# Patient Record
Sex: FEMALE | Race: WHITE | URBAN - METROPOLITAN AREA
[De-identification: names, ages, dates, MRNs, and addresses within clinical notes are randomized per-mention and may not be internally consistent; named-entity substitution may affect disease eponyms.]

---

## 2020-11-11 ENCOUNTER — OFFICE VISIT (OUTPATIENT)
Dept: URBAN - METROPOLITAN AREA CLINIC 13 | Facility: CLINIC | Age: 80
End: 2020-11-11
Payer: MEDICARE

## 2020-11-11 DIAGNOSIS — H04.123 DRY EYE SYNDROME OF BILATERAL LACRIMAL GLANDS: ICD-10-CM

## 2020-11-11 PROCEDURE — 92134 CPTRZ OPH DX IMG PST SGM RTA: CPT | Performed by: OPHTHALMOLOGY

## 2020-11-11 PROCEDURE — 92014 COMPRE OPH EXAM EST PT 1/>: CPT | Performed by: OPHTHALMOLOGY

## 2020-11-11 PROCEDURE — 67028 INJECTION EYE DRUG: CPT | Performed by: OPHTHALMOLOGY

## 2020-11-11 ASSESSMENT — INTRAOCULAR PRESSURE
OD: 16
OS: 17

## 2020-11-11 NOTE — IMPRESSION/PLAN
Impression: Macular Degeneration, dry with PED OD. Status: Symptomatic. Plan: Exam and OCT reveal a PED and atrophy OD. Exam and OCT reveal no IRF. An IVFA from 05/19/2020 demonstrated no leakage. Fundus photos from 05/19/2020 demonstrated drusen. Observe. Recommend AREANDREI and Jake.  Thanks, Germaine Cornell

## 2020-11-11 NOTE — IMPRESSION/PLAN
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 Status: Symptomatic. Plan: Patient presents complaining of loss of vision OS. Exam and OCT reveal a PED OS with possible IRF and exudate. Loli Ziegler An IVFA from 05/19/2020 demonstrated no leakage. Recommend trial of Avastin OS today; discussed that if vision does not improve, then she can be observed off treatment. R/B/A discussed, patient elects to proceed. Intravitreal Avastin injected OS today without complication. 

RTC 4 wks with OCT OU, poss Avastin OS

## 2020-12-15 ENCOUNTER — OFFICE VISIT (OUTPATIENT)
Dept: URBAN - METROPOLITAN AREA CLINIC 68 | Facility: CLINIC | Age: 80
End: 2020-12-15
Payer: MEDICARE

## 2020-12-15 PROCEDURE — 92134 CPTRZ OPH DX IMG PST SGM RTA: CPT | Performed by: OPHTHALMOLOGY

## 2020-12-15 PROCEDURE — 67028 INJECTION EYE DRUG: CPT | Performed by: OPHTHALMOLOGY

## 2020-12-15 PROCEDURE — 92014 COMPRE OPH EXAM EST PT 1/>: CPT | Performed by: OPHTHALMOLOGY

## 2020-12-15 ASSESSMENT — INTRAOCULAR PRESSURE
OS: 11
OD: 12

## 2020-12-15 NOTE — IMPRESSION/PLAN
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 Status: Symptomatic.
  s/p Avastin OS x 1, last 11/11/2020 Plan: The patient notes distortion. Exam and OCT reveal a PED OS. An IVFA from 05/19/2020 demonstrated no leakage. Recommend Avastin. R/B/A discussed with patient. The risks of Avastin were discussed, including off-label use and compounding pharmacy risks, and the patient elects to proceed with Avastin. After 2% Subconjunctival anesthesia & betadine prep, 1.25mg of Avastin was injected in the eye. Cold compress and Tylenol suggested for pain if needed. 

Return in 4-6 wks with OCT OU, possible Avastin OS, IVFA OS 1st

## 2020-12-15 NOTE — IMPRESSION/PLAN
Impression: Macular Degeneration, dry with PED OD. Status: Symptomatic. Plan: Exam and OCT reveal a PED and atrophy OD. Exam and OCT reveal no IRF. An IVFA from 05/19/2020 demonstrated no leakage. Fundus photos from 05/19/2020 demonstrated drusen. Observe. Recommend AREANDREI and Jake.  Thanks, Micah Rea

## 2021-02-02 ENCOUNTER — OFFICE VISIT (OUTPATIENT)
Dept: URBAN - METROPOLITAN AREA CLINIC 73 | Facility: CLINIC | Age: 81
End: 2021-02-02
Payer: MEDICARE

## 2021-02-02 PROCEDURE — 67028 INJECTION EYE DRUG: CPT | Performed by: OPHTHALMOLOGY

## 2021-02-02 PROCEDURE — 92134 CPTRZ OPH DX IMG PST SGM RTA: CPT | Performed by: OPHTHALMOLOGY

## 2021-02-02 PROCEDURE — 92235 FLUORESCEIN ANGRPH MLTIFRAME: CPT | Performed by: OPHTHALMOLOGY

## 2021-02-02 PROCEDURE — 92014 COMPRE OPH EXAM EST PT 1/>: CPT | Performed by: OPHTHALMOLOGY

## 2021-02-02 ASSESSMENT — INTRAOCULAR PRESSURE
OS: 13
OD: 12

## 2021-02-02 NOTE — IMPRESSION/PLAN
Impression: Macular Degeneration, dry with PED OD. Status: Symptomatic. Plan: Exam and OCT reveal a PED and atrophy OD. Exam and OCT reveal no IRF. An IVFA from 02/02/2021 demonstrated no leakage. Fundus photos from 02/02/2021 demonstrated drusen. Observe. Recommend ARENADREI and Jake.  Thanks, Taylor Neely

## 2021-02-02 NOTE — IMPRESSION/PLAN
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 Status: Symptomatic.
  s/p Avastin OS x 1, last 11/11/2020 Plan: The patient notes distortion. Exam and OCT reveal a PED OS. An IVFA from 02/02/2021 demonstrated staining. Recommend Avastin. R/B/A discussed with patient. The risks of Avastin were discussed, including off-label use and compounding pharmacy risks, and the patient elects to proceed with Avastin. After 2% Subconjunctival anesthesia & betadine prep, 1.25mg of Avastin was injected in the eye. Cold compress and Tylenol suggested for pain if needed. Return in 4-6 wks with OCT OU, possible Lucentis OS.

## 2021-03-02 ENCOUNTER — OFFICE VISIT (OUTPATIENT)
Dept: URBAN - METROPOLITAN AREA CLINIC 73 | Facility: CLINIC | Age: 81
End: 2021-03-02
Payer: MEDICARE

## 2021-03-02 PROCEDURE — 67028 INJECTION EYE DRUG: CPT | Performed by: OPHTHALMOLOGY

## 2021-03-02 PROCEDURE — 92134 CPTRZ OPH DX IMG PST SGM RTA: CPT | Performed by: OPHTHALMOLOGY

## 2021-03-02 PROCEDURE — 92014 COMPRE OPH EXAM EST PT 1/>: CPT | Performed by: OPHTHALMOLOGY

## 2021-03-02 ASSESSMENT — INTRAOCULAR PRESSURE
OS: 15
OD: 17

## 2021-03-02 NOTE — IMPRESSION/PLAN
Impression: Macular Degeneration, Wet with PED OS. Status: Symptomatic.
  s/p Avastin OS, last 02/02/2021 Plan: The patient notes worsening distortion. Exam and OCT reveal a PED OS. An IVFA from 02/02/2021 demonstrated staining. Recommend Lucentis. R/B/A discussed with patient, and the patient elects to proceed with Lucentis. Return in 4-6 weeks with OCT OU, possible Lucentis OS.

## 2021-03-02 NOTE — IMPRESSION/PLAN
Impression: Macular Degeneration, dry with PED OD. Status: Symptomatic. Plan: Exam and OCT reveal a PED and atrophy OD. Exam and OCT reveal no IRF. An IVFA from 02/02/2021 demonstrated no leakage. Fundus photos from 02/02/2021 demonstrated drusen. Observe. Recommend AREANDREI and Jake.  Thanks, Cecil Lu

## 2021-03-30 ENCOUNTER — OFFICE VISIT (OUTPATIENT)
Dept: URBAN - METROPOLITAN AREA CLINIC 73 | Facility: CLINIC | Age: 81
End: 2021-03-30
Payer: MEDICARE

## 2021-03-30 DIAGNOSIS — E11.3293 TYPE 2 DIAB WITH MILD NONP RTNOP WITHOUT MACULAR EDEMA, BI: ICD-10-CM

## 2021-03-30 PROCEDURE — 92014 COMPRE OPH EXAM EST PT 1/>: CPT | Performed by: OPHTHALMOLOGY

## 2021-03-30 PROCEDURE — 67028 INJECTION EYE DRUG: CPT | Performed by: OPHTHALMOLOGY

## 2021-03-30 ASSESSMENT — INTRAOCULAR PRESSURE
OD: 13
OS: 13

## 2021-03-30 NOTE — IMPRESSION/PLAN
Impression: Macular Degeneration, Wet with PED OS. Status: Symptomatic.
  s/p Avastin OS, last 02/02/2021
s/p Lucentis x1  03/02/2021  Plan: Exam and OCT reveal a PED OS. An IVFA from 02/02/2021 demonstrated staining. Recommend Lucentis. R/B/A discussed with patient, and the patient elects to proceed with Lucentis. Return in 4-6 weeks with OCT OU, possible Lucentis OS.

## 2021-04-27 ENCOUNTER — OFFICE VISIT (OUTPATIENT)
Dept: URBAN - METROPOLITAN AREA CLINIC 73 | Facility: CLINIC | Age: 81
End: 2021-04-27
Payer: MEDICARE

## 2021-04-27 DIAGNOSIS — Z96.1 PRESENCE OF INTRAOCULAR LENS: ICD-10-CM

## 2021-04-27 DIAGNOSIS — H43.813 VITREOUS DEGENERATION, BILATERAL: ICD-10-CM

## 2021-04-27 PROCEDURE — 92014 COMPRE OPH EXAM EST PT 1/>: CPT | Performed by: OPHTHALMOLOGY

## 2021-04-27 PROCEDURE — 92134 CPTRZ OPH DX IMG PST SGM RTA: CPT | Performed by: OPHTHALMOLOGY

## 2021-04-27 PROCEDURE — 67028 INJECTION EYE DRUG: CPT | Performed by: OPHTHALMOLOGY

## 2021-04-27 ASSESSMENT — INTRAOCULAR PRESSURE
OS: 12
OD: 11

## 2021-05-25 ENCOUNTER — OFFICE VISIT (OUTPATIENT)
Dept: URBAN - METROPOLITAN AREA CLINIC 73 | Facility: CLINIC | Age: 81
End: 2021-05-25
Payer: MEDICARE

## 2021-05-25 PROCEDURE — 67028 INJECTION EYE DRUG: CPT | Performed by: OPHTHALMOLOGY

## 2021-05-25 PROCEDURE — 92014 COMPRE OPH EXAM EST PT 1/>: CPT | Performed by: OPHTHALMOLOGY

## 2021-05-25 ASSESSMENT — INTRAOCULAR PRESSURE
OD: 16
OS: 17

## 2021-05-25 NOTE — IMPRESSION/PLAN
Impression: Macular Degeneration, Wet with PED OS. Status: Symptomatic.
  s/p Avastin OS, last 02/02/2021
  s/p Lucentis x 3  04/27/2021 Plan: The patient notes distortion. Exam and OCT reveal a PED OS. An IVFA from 02/02/2021 demonstrated staining. Recommend Lucentis. R/B/A discussed with patient, and the patient elects to proceed with Lucentis. Return in 4-6 weeks with OCT OU, possible Lucentis OS.

## 2021-07-06 ENCOUNTER — OFFICE VISIT (OUTPATIENT)
Dept: URBAN - METROPOLITAN AREA CLINIC 73 | Facility: CLINIC | Age: 81
End: 2021-07-06
Payer: MEDICARE

## 2021-07-06 PROCEDURE — 67028 INJECTION EYE DRUG: CPT | Performed by: OPHTHALMOLOGY

## 2021-07-06 PROCEDURE — 92014 COMPRE OPH EXAM EST PT 1/>: CPT | Performed by: OPHTHALMOLOGY

## 2021-07-06 PROCEDURE — 92134 CPTRZ OPH DX IMG PST SGM RTA: CPT | Performed by: OPHTHALMOLOGY

## 2021-07-06 ASSESSMENT — INTRAOCULAR PRESSURE
OS: 14
OD: 13

## 2021-07-06 NOTE — IMPRESSION/PLAN
Impression: Macular Degeneration, Wet with PED OS. Status: Symptomatic.
  s/p Avastin OS, last 02/02/2021
  s/p Lucentis x 4  05/25/2021 Plan: The patient notes continued distortion. Exam and OCT reveal a PED OS. An IVFA from 02/02/2021 demonstrated staining. Recommend Lucentis. R/B/A discussed with patient, and the patient elects to proceed with Lucentis. Return in 4-6 weeks with OCT OU, possible Lucentis OS.

## 2021-08-31 ENCOUNTER — OFFICE VISIT (OUTPATIENT)
Dept: URBAN - METROPOLITAN AREA CLINIC 73 | Facility: CLINIC | Age: 81
End: 2021-08-31
Payer: MEDICARE

## 2021-08-31 PROCEDURE — 67028 INJECTION EYE DRUG: CPT | Performed by: OPHTHALMOLOGY

## 2021-08-31 PROCEDURE — 92014 COMPRE OPH EXAM EST PT 1/>: CPT | Performed by: OPHTHALMOLOGY

## 2021-08-31 ASSESSMENT — INTRAOCULAR PRESSURE
OS: 13
OD: 12

## 2021-08-31 NOTE — IMPRESSION/PLAN
Impression: Macular Degeneration, Wet with PED OS. Status: Symptomatic.
  s/p Avastin OS, last 02/02/2021
  s/p Lucentis x 5  07/06/2021 Plan: Exam and OCT reveal a PED OS. An IVFA from 02/02/2021 demonstrated staining. Recommend Lucentis. R/B/A discussed with patient, and the patient elects to proceed with Lucentis. Will consider Avastin Return in 4-6 weeks with OCT OU, possible Avastin OS Ezequiel Zaldivar

## 2021-09-28 ENCOUNTER — OFFICE VISIT (OUTPATIENT)
Dept: URBAN - METROPOLITAN AREA CLINIC 73 | Facility: CLINIC | Age: 81
End: 2021-09-28
Payer: MEDICARE

## 2021-09-28 DIAGNOSIS — H35.3231 EXUDATIVE AGE-REL MCLR DEGN, BI, WITH ACTV CHRDL NEOVAS: Primary | ICD-10-CM

## 2021-09-28 PROCEDURE — 92014 COMPRE OPH EXAM EST PT 1/>: CPT | Performed by: OPHTHALMOLOGY

## 2021-09-28 PROCEDURE — 92134 CPTRZ OPH DX IMG PST SGM RTA: CPT | Performed by: OPHTHALMOLOGY

## 2021-09-28 PROCEDURE — 67028 INJECTION EYE DRUG: CPT | Performed by: OPHTHALMOLOGY

## 2021-09-28 ASSESSMENT — INTRAOCULAR PRESSURE
OS: 13
OD: 15

## 2021-09-28 NOTE — IMPRESSION/PLAN
Impression: Macular Degeneration, Wet with PED OS. Status: Symptomatic.
  s/p Avastin OS, last 02/02/2021
  s/p Lucentis x 6  08/31/2021  Plan: Exam and OCT reveal a PED OS. An IVFA from 02/02/2021 demonstrated staining. Fundus photos from 2/2/2021 demonstrated drusen. Recommend Avastin. R/B/A discussed with patient. The risks of Avastin were discussed, including off-label use and compounding pharmacy risks, and the patient elects to proceed with Avastin. After 2% Subconjunctival anesthesia & betadine prep, 1.25mg of Avastin was injected in the eye. Cold compress and Tylenol suggested for pain if needed. 

Return in 4-6 weeks with OCT OU, possible Avastin OS, IVFA OS 1st

## 2021-11-09 ENCOUNTER — OFFICE VISIT (OUTPATIENT)
Dept: URBAN - METROPOLITAN AREA CLINIC 73 | Facility: CLINIC | Age: 81
End: 2021-11-09
Payer: MEDICARE

## 2021-11-09 PROCEDURE — 92014 COMPRE OPH EXAM EST PT 1/>: CPT | Performed by: OPHTHALMOLOGY

## 2021-11-09 PROCEDURE — 92134 CPTRZ OPH DX IMG PST SGM RTA: CPT | Performed by: OPHTHALMOLOGY

## 2021-11-09 PROCEDURE — 67028 INJECTION EYE DRUG: CPT | Performed by: OPHTHALMOLOGY

## 2021-11-09 ASSESSMENT — INTRAOCULAR PRESSURE
OS: 14
OD: 15

## 2021-11-09 NOTE — IMPRESSION/PLAN
Impression: Macular Degeneration, Wet with PED OS. Status: Symptomatic.
  s/p Avastin OS, last 09/28/2021
  s/p Lucentis x 6  08/31/2021 Plan: The patient notes blurring. Exam and OCT reveal a PED OS. An IVFA from 02/02/2021 demonstrated staining. Fundus photos from 2/2/2021 demonstrated drusen. Recommend Avastin. R/B/A discussed with patient. The risks of Avastin were discussed, including off-label use and compounding pharmacy risks, and the patient elects to proceed with Avastin. After 2% Subconjunctival anesthesia & betadine prep, 1.25mg of Avastin was injected in the eye. Cold compress and Tylenol suggested for pain if needed. 

Return in 4-6 weeks with OCT OU, possible Avastin OS, IVFA OS 1st

## 2021-12-07 ENCOUNTER — OFFICE VISIT (OUTPATIENT)
Dept: URBAN - METROPOLITAN AREA CLINIC 73 | Facility: CLINIC | Age: 81
End: 2021-12-07
Payer: MEDICARE

## 2021-12-07 PROCEDURE — 92134 CPTRZ OPH DX IMG PST SGM RTA: CPT | Performed by: OPHTHALMOLOGY

## 2021-12-07 PROCEDURE — 92235 FLUORESCEIN ANGRPH MLTIFRAME: CPT | Performed by: OPHTHALMOLOGY

## 2021-12-07 PROCEDURE — 67028 INJECTION EYE DRUG: CPT | Performed by: OPHTHALMOLOGY

## 2021-12-07 ASSESSMENT — INTRAOCULAR PRESSURE
OD: 13
OS: 14

## 2021-12-07 NOTE — IMPRESSION/PLAN
Impression: Macular Degeneration, Wet with PED OS. Status: Symptomatic.
  s/p Avastin OS, last 11/09/2021 
  s/p Lucentis x 6  08/31/2021 Plan: The patient notes continued blurring. Exam and OCT reveal a PED OS. An IVFA from 12/07/2021 demonstrated staining. Fundus photos from 12/07/2021 demonstrated drusen. Recommend Avastin. R/B/A discussed with patient. The risks of Avastin were discussed, including off-label use and compounding pharmacy risks, and the patient elects to proceed with Avastin. After 2% Subconjunctival anesthesia & betadine prep, 1.25mg of Avastin was injected in the eye. Cold compress and Tylenol suggested for pain if needed. 

Return in 4-6 weeks with OCT OU, possible Avastin OS

## 2021-12-07 NOTE — IMPRESSION/PLAN
Impression: Macular Degeneration, dry with PED OD. Status: Symptomatic. Plan: Exam and OCT reveal a PED and atrophy OD. Exam and OCT reveal no IRF. An IVFA from 02/02/2021 demonstrated no leakage. Fundus photos from 02/02/2021 demonstrated drusen. Observe. Recommend PATRICK and Jake.  Thanks, Douglas Bearden

## 2022-01-04 ENCOUNTER — OFFICE VISIT (OUTPATIENT)
Dept: URBAN - METROPOLITAN AREA CLINIC 73 | Facility: CLINIC | Age: 82
End: 2022-01-04
Payer: MEDICARE

## 2022-01-04 PROCEDURE — 67028 INJECTION EYE DRUG: CPT | Performed by: OPHTHALMOLOGY

## 2022-01-04 PROCEDURE — 92014 COMPRE OPH EXAM EST PT 1/>: CPT | Performed by: OPHTHALMOLOGY

## 2022-01-04 PROCEDURE — 92134 CPTRZ OPH DX IMG PST SGM RTA: CPT | Performed by: OPHTHALMOLOGY

## 2022-01-04 NOTE — IMPRESSION/PLAN
Impression: Macular Degeneration, dry with PED OD. Status: Symptomatic. Plan: Exam and OCT reveal a PED and atrophy OD. Exam and OCT reveal no IRF. An IVFA from 02/02/2021 demonstrated no leakage. Fundus photos from 02/02/2021 demonstrated drusen. Observe. Recommend PATRICK and Jake.  Thanks, Cassandra Ahumada

## 2022-01-04 NOTE — IMPRESSION/PLAN
Impression: Macular Degeneration, Wet with PED OS. Status: Symptomatic.
  s/p Avastin OS, last 12/07/2021 
  s/p Lucentis x 6  08/31/2021 Plan: Exam and OCT reveal a PED OS. An IVFA from 12/07/2021 demonstrated staining. Fundus photos from 12/07/2021 demonstrated drusen. Recommend Avastin. R/B/A discussed with patient. The risks of Avastin were discussed, including off-label use and compounding pharmacy risks, and the patient elects to proceed with Avastin. After 2% Subconjunctival anesthesia & betadine prep, 1.25mg of Avastin was injected in the eye. Cold compress and Tylenol suggested for pain if needed. 

Return in 4-6 weeks with OCT OU, possible Avastin OS

## 2022-02-01 ENCOUNTER — OFFICE VISIT (OUTPATIENT)
Dept: URBAN - METROPOLITAN AREA CLINIC 73 | Facility: CLINIC | Age: 82
End: 2022-02-01
Payer: MEDICARE

## 2022-02-01 PROCEDURE — 92014 COMPRE OPH EXAM EST PT 1/>: CPT | Performed by: OPHTHALMOLOGY

## 2022-02-01 PROCEDURE — 67028 INJECTION EYE DRUG: CPT | Performed by: OPHTHALMOLOGY

## 2022-02-01 PROCEDURE — 92134 CPTRZ OPH DX IMG PST SGM RTA: CPT | Performed by: OPHTHALMOLOGY

## 2022-02-01 ASSESSMENT — INTRAOCULAR PRESSURE
OS: 13
OD: 15

## 2022-02-01 NOTE — IMPRESSION/PLAN
Impression: Macular Degeneration, dry with PED OD. Status: Symptomatic. Plan: Exam and OCT reveal a PED and atrophy OD. Exam and OCT reveal no IRF. An IVFA from 02/02/2021 demonstrated no leakage. Fundus photos from 02/02/2021 demonstrated drusen. Observe. Recommend AREANDREI and Jake.  Thanks, Taylor Neely

## 2022-02-01 NOTE — IMPRESSION/PLAN
Impression: Macular Degeneration, Wet with PED OS. Status: Symptomatic.
  s/p Avastin OS, last 01/04/2022 
  s/p Lucentis x 6  08/31/2021 Plan: The patient notes blurring. Exam and OCT reveal a PED OS. An IVFA from 12/07/2021 demonstrated staining. Fundus photos from 12/07/2021 demonstrated drusen. Recommend Avastin. R/B/A discussed with patient. The risks of Avastin were discussed, including off-label use and compounding pharmacy risks, and the patient elects to proceed with Avastin. After 2% Subconjunctival anesthesia & betadine prep, 1.25mg of Avastin was injected in the eye. Cold compress and Tylenol suggested for pain if needed. 

Return in 4-6 weeks with OCT OU, possible Avastin OS

## 2022-03-01 ENCOUNTER — OFFICE VISIT (OUTPATIENT)
Dept: URBAN - METROPOLITAN AREA CLINIC 73 | Facility: CLINIC | Age: 82
End: 2022-03-01
Payer: MEDICARE

## 2022-03-01 PROCEDURE — 92134 CPTRZ OPH DX IMG PST SGM RTA: CPT | Performed by: OPHTHALMOLOGY

## 2022-03-01 PROCEDURE — 67028 INJECTION EYE DRUG: CPT | Performed by: OPHTHALMOLOGY

## 2022-03-01 PROCEDURE — 92014 COMPRE OPH EXAM EST PT 1/>: CPT | Performed by: OPHTHALMOLOGY

## 2022-03-01 ASSESSMENT — INTRAOCULAR PRESSURE
OD: 16
OS: 15

## 2022-03-01 NOTE — IMPRESSION/PLAN
Impression: Macular Degeneration, Wet with PED OS. Status: Symptomatic.
  s/p Avastin OS, last 02/01/2022
  s/p Lucentis x 6  08/31/2021 Plan: The patient notes continued blurring. Exam and OCT reveal a PED OS. An IVFA from 12/07/2021 demonstrated staining. Fundus photos from 12/07/2021 demonstrated drusen. Recommend Avastin. R/B/A discussed with patient. The risks of Avastin were discussed, including off-label use and compounding pharmacy risks, and the patient elects to proceed with Avastin. After 2% Subconjunctival anesthesia & betadine prep, 1.25mg of Avastin was injected in the eye. Cold compress and Tylenol suggested for pain if needed. 

Return in 4-6 weeks with OCT OU, possible Avastin OS

## 2022-03-01 NOTE — IMPRESSION/PLAN
Impression: Macular Degeneration, dry with PED OD. Status: Symptomatic. Plan: Exam and OCT reveal a PED and atrophy OD. Exam and OCT reveal no IRF. An IVFA from 02/02/2021 demonstrated no leakage. Fundus photos from 02/02/2021 demonstrated drusen. Observe. Recommend AREANDREI and Jake.  Thanks, Tari Butler

## 2022-04-12 ENCOUNTER — OFFICE VISIT (OUTPATIENT)
Dept: URBAN - METROPOLITAN AREA CLINIC 73 | Facility: CLINIC | Age: 82
End: 2022-04-12
Payer: MEDICARE

## 2022-04-12 DIAGNOSIS — H04.123 DRY EYE SYNDROME OF BILATERAL LACRIMAL GLANDS: ICD-10-CM

## 2022-04-12 DIAGNOSIS — H43.813 VITREOUS DEGENERATION, BILATERAL: ICD-10-CM

## 2022-04-12 DIAGNOSIS — E11.3293 TYPE 2 DIAB WITH MILD NONP RTNOP WITHOUT MACULAR EDEMA, BI: ICD-10-CM

## 2022-04-12 DIAGNOSIS — Z96.1 PRESENCE OF INTRAOCULAR LENS: ICD-10-CM

## 2022-04-12 DIAGNOSIS — H35.3231 EXUDATIVE AGE-REL MCLR DEGN, BI, WITH ACTV CHRDL NEOVAS: Primary | ICD-10-CM

## 2022-04-12 PROCEDURE — 92134 CPTRZ OPH DX IMG PST SGM RTA: CPT | Performed by: OPHTHALMOLOGY

## 2022-04-12 PROCEDURE — 92014 COMPRE OPH EXAM EST PT 1/>: CPT | Performed by: OPHTHALMOLOGY

## 2022-04-12 PROCEDURE — 67028 INJECTION EYE DRUG: CPT | Performed by: OPHTHALMOLOGY

## 2022-04-12 NOTE — IMPRESSION/PLAN
Impression: Macular Degeneration, Wet with PED OS. Status: Symptomatic.
  s/p Avastin OS, last 03/01/2022
  s/p Lucentis x 6  08/31/2021 Plan: Exam and OCT reveal a PED OS. An IVFA from 12/07/2021 demonstrated staining. Fundus photos from 12/07/2021 demonstrated drusen. Recommend Avastin. R/B/A discussed with patient. The risks of Avastin were discussed, including off-label use and compounding pharmacy risks, and the patient elects to proceed with Avastin. After 2% Subconjunctival anesthesia & betadine prep, 1.25mg of Avastin was injected in the eye. Cold compress and Tylenol suggested for pain if needed. 

Return in 4-6 weeks with OCT OU, possible Avastin OS, IVFA OS 1st

## 2022-04-12 NOTE — IMPRESSION/PLAN
Impression: Macular Degeneration, dry with PED OD. Status: Symptomatic. Plan: Exam and OCT reveal a PED and atrophy OD. Exam and OCT reveal no IRF. An IVFA from 02/02/2021 demonstrated no leakage. Fundus photos from 02/02/2021 demonstrated drusen. Observe. Recommend AREANDREI and Jake.  Thanks, Germaine Cornell

## 2022-05-10 ENCOUNTER — OFFICE VISIT (OUTPATIENT)
Dept: URBAN - METROPOLITAN AREA CLINIC 73 | Facility: CLINIC | Age: 82
End: 2022-05-10
Payer: MEDICARE

## 2022-05-10 DIAGNOSIS — E11.3293 TYPE 2 DIAB WITH MILD NONP RTNOP WITHOUT MACULAR EDEMA, BI: ICD-10-CM

## 2022-05-10 DIAGNOSIS — H04.123 DRY EYE SYNDROME OF BILATERAL LACRIMAL GLANDS: ICD-10-CM

## 2022-05-10 DIAGNOSIS — Z96.1 PRESENCE OF INTRAOCULAR LENS: ICD-10-CM

## 2022-05-10 DIAGNOSIS — H35.3231 EXUDATIVE AGE-REL MCLR DEGN, BI, WITH ACTV CHRDL NEOVAS: Primary | ICD-10-CM

## 2022-05-10 DIAGNOSIS — H43.813 VITREOUS DEGENERATION, BILATERAL: ICD-10-CM

## 2022-05-10 PROCEDURE — 92235 FLUORESCEIN ANGRPH MLTIFRAME: CPT | Performed by: OPHTHALMOLOGY

## 2022-05-10 PROCEDURE — 67028 INJECTION EYE DRUG: CPT | Performed by: OPHTHALMOLOGY

## 2022-05-10 PROCEDURE — 92134 CPTRZ OPH DX IMG PST SGM RTA: CPT | Performed by: OPHTHALMOLOGY

## 2022-05-10 PROCEDURE — 92014 COMPRE OPH EXAM EST PT 1/>: CPT | Performed by: OPHTHALMOLOGY

## 2022-05-10 ASSESSMENT — INTRAOCULAR PRESSURE
OS: 18
OD: 16

## 2022-05-10 NOTE — IMPRESSION/PLAN
Impression: Macular Degeneration, Wet with PED OS. Status: Symptomatic.
  s/p Avastin OS, last 04/12/2022 
  s/p Lucentis x 6  08/31/2021 Plan: Exam and OCT reveal a PED OS. An IVFA from 05/10/2022 demonstrated staining. Fundus photos from 05/10/2022 demonstrated drusen. Recommend Avastin. R/B/A discussed with patient. The risks of Avastin were discussed, including off-label use and compounding pharmacy risks, and the patient elects to proceed with Avastin. After 2% Subconjunctival anesthesia & betadine prep, 1.25mg of Avastin was injected in the eye. Cold compress and Tylenol suggested for pain if needed. 

Return in 4-6 weeks with OCT OU, possible Avastin OS

## 2022-05-10 NOTE — IMPRESSION/PLAN
Impression: Macular Degeneration, dry with PED OD. Status: Symptomatic. Plan: Exam and OCT reveal a PED and atrophy OD. Exam and OCT reveal no IRF. An IVFA from 05/10/2022 demonstrated no leakage. Fundus photos from 05/10/2022 demonstrated drusen. Observe. Recommend PATRICK and Jake.  Thanks, [facility]

## 2022-06-21 ENCOUNTER — OFFICE VISIT (OUTPATIENT)
Dept: URBAN - METROPOLITAN AREA CLINIC 73 | Facility: CLINIC | Age: 82
End: 2022-06-21
Payer: MEDICARE

## 2022-06-21 DIAGNOSIS — E11.3293 TYPE 2 DIAB WITH MILD NONP RTNOP WITHOUT MACULAR EDEMA, BI: ICD-10-CM

## 2022-06-21 DIAGNOSIS — H43.813 VITREOUS DEGENERATION, BILATERAL: ICD-10-CM

## 2022-06-21 DIAGNOSIS — H35.3231 EXUDATIVE AGE-REL MCLR DEGN, BI, WITH ACTV CHRDL NEOVAS: Primary | ICD-10-CM

## 2022-06-21 DIAGNOSIS — H04.123 DRY EYE SYNDROME OF BILATERAL LACRIMAL GLANDS: ICD-10-CM

## 2022-06-21 DIAGNOSIS — Z96.1 PRESENCE OF INTRAOCULAR LENS: ICD-10-CM

## 2022-06-21 PROCEDURE — 67028 INJECTION EYE DRUG: CPT | Performed by: OPHTHALMOLOGY

## 2022-06-21 PROCEDURE — 92134 CPTRZ OPH DX IMG PST SGM RTA: CPT | Performed by: OPHTHALMOLOGY

## 2022-06-21 PROCEDURE — 92014 COMPRE OPH EXAM EST PT 1/>: CPT | Performed by: OPHTHALMOLOGY

## 2022-06-21 ASSESSMENT — INTRAOCULAR PRESSURE
OD: 12
OS: 10

## 2022-06-21 NOTE — IMPRESSION/PLAN
Impression: Macular Degeneration, dry with PED OD. Status: Symptomatic. Plan: Exam and OCT reveal a PED and atrophy OD. Exam and OCT reveal no IRF. An IVFA from 05/10/2022 demonstrated no leakage. Fundus photos from 05/10/2022 demonstrated drusen. Observe. Recommend AREANDREI and Jake.  Thanks, John Early

## 2022-06-21 NOTE — IMPRESSION/PLAN
Impression: Macular Degeneration, Wet with PED OS. Status: Symptomatic.
  s/p Avastin OS, last 05/10/2022 
  s/p Lucentis x 6  08/31/2021 Plan: The patient notes blurring. Exam and OCT reveal a PED OS. An IVFA from 05/10/2022 demonstrated staining. Fundus photos from 05/10/2022 demonstrated drusen. Recommend Avastin. R/B/A discussed with patient. The risks of Avastin were discussed, including off-label use and compounding pharmacy risks, and the patient elects to proceed with Avastin. After 2% Subconjunctival anesthesia & betadine prep, 1.25mg of Avastin was injected in the eye. Cold compress and Tylenol suggested for pain if needed. 

Return in 4-6 weeks with OCT OU, possible Avastin OS

## 2022-08-16 ENCOUNTER — OFFICE VISIT (OUTPATIENT)
Facility: LOCATION | Age: 82
End: 2022-08-16
Payer: MEDICARE

## 2022-08-16 DIAGNOSIS — E11.3293 TYPE 2 DIAB WITH MILD NONP RTNOP WITHOUT MACULAR EDEMA, BI: ICD-10-CM

## 2022-08-16 DIAGNOSIS — H43.813 VITREOUS DEGENERATION, BILATERAL: ICD-10-CM

## 2022-08-16 DIAGNOSIS — H04.123 DRY EYE SYNDROME OF BILATERAL LACRIMAL GLANDS: ICD-10-CM

## 2022-08-16 DIAGNOSIS — Z96.1 PRESENCE OF INTRAOCULAR LENS: ICD-10-CM

## 2022-08-16 DIAGNOSIS — H35.3231 EXUDATIVE AGE-REL MCLR DEGN, BI, WITH ACTV CHRDL NEOVAS: Primary | ICD-10-CM

## 2022-08-16 PROCEDURE — 92014 COMPRE OPH EXAM EST PT 1/>: CPT | Performed by: OPHTHALMOLOGY

## 2022-08-16 PROCEDURE — 67028 INJECTION EYE DRUG: CPT | Performed by: OPHTHALMOLOGY

## 2022-08-16 ASSESSMENT — INTRAOCULAR PRESSURE
OD: 13
OS: 12

## 2022-08-16 NOTE — IMPRESSION/PLAN
Impression: Macular Degeneration, dry with PED OD. Status: Symptomatic. Plan: Exam and OCT reveal a PED and atrophy OD. Exam and OCT reveal no IRF. An IVFA from 05/10/2022 demonstrated no leakage. Fundus photos from 05/10/2022 demonstrated drusen. Observe. Recommend AREANDREI and Jake.  Thanks, Taylor Neely

## 2022-08-16 NOTE — IMPRESSION/PLAN
Impression: Macular Degeneration, Wet with PED OS. Status: Symptomatic.
  s/p Avastin OS, last 06/21/2022
  s/p Lucentis x 6  08/31/2021 Plan: The patient notes continued blurring. Exam and OCT reveal a PED OS. An IVFA from 05/10/2022 demonstrated staining. Fundus photos from 05/10/2022 demonstrated drusen. Recommend Avastin. R/B/A discussed with patient. The risks of Avastin were discussed, including off-label use and compounding pharmacy risks, and the patient elects to proceed with Avastin. After 2% Subconjunctival anesthesia & betadine prep, 1.25mg of Avastin was injected in the eye. Cold compress and Tylenol suggested for pain if needed. 

Return in 4-6 weeks with OCT OU, possible Avastin OS

## 2022-09-13 ENCOUNTER — OFFICE VISIT (OUTPATIENT)
Facility: LOCATION | Age: 82
End: 2022-09-13
Payer: MEDICARE

## 2022-09-13 DIAGNOSIS — H43.813 VITREOUS DEGENERATION, BILATERAL: ICD-10-CM

## 2022-09-13 DIAGNOSIS — E11.3293 TYPE 2 DIAB WITH MILD NONP RTNOP WITHOUT MACULAR EDEMA, BI: ICD-10-CM

## 2022-09-13 DIAGNOSIS — H04.123 DRY EYE SYNDROME OF BILATERAL LACRIMAL GLANDS: ICD-10-CM

## 2022-09-13 DIAGNOSIS — Z96.1 PRESENCE OF INTRAOCULAR LENS: ICD-10-CM

## 2022-09-13 DIAGNOSIS — H35.3231 EXUDATIVE AGE-REL MCLR DEGN, BI, WITH ACTV CHRDL NEOVAS: Primary | ICD-10-CM

## 2022-09-13 PROCEDURE — 67028 INJECTION EYE DRUG: CPT | Performed by: OPHTHALMOLOGY

## 2022-09-13 PROCEDURE — 92014 COMPRE OPH EXAM EST PT 1/>: CPT | Performed by: OPHTHALMOLOGY

## 2022-09-13 PROCEDURE — 92134 CPTRZ OPH DX IMG PST SGM RTA: CPT | Performed by: OPHTHALMOLOGY

## 2022-09-13 ASSESSMENT — INTRAOCULAR PRESSURE
OS: 15
OD: 17

## 2022-09-13 NOTE — IMPRESSION/PLAN
Impression: Macular Degeneration, dry with PED OD. Status: Symptomatic. Plan: Exam and OCT reveal a PED and atrophy OD. Exam and OCT reveal no IRF. An IVFA from 05/10/2022 demonstrated no leakage. Fundus photos from 05/10/2022 demonstrated drusen. Observe. Recommend AREANDREI and Jake.  Thanks, Maggie Gómez

## 2022-09-13 NOTE — IMPRESSION/PLAN
Impression: Macular Degeneration, Wet with PED OS. Status: Symptomatic.
  s/p Avastin OS, last 08/16/2022 
  s/p Lucentis x 6  08/31/2021 Plan: Exam and OCT reveal a PED OS. An IVFA from 05/10/2022 demonstrated staining. Fundus photos from 05/10/2022 demonstrated drusen. Recommend Avastin. R/B/A discussed with patient. The risks of Avastin were discussed, including off-label use and compounding pharmacy risks, and the patient elects to proceed with Avastin. After 2% Subconjunctival anesthesia & betadine prep, 1.25mg of Avastin was injected in the eye. Cold compress and Tylenol suggested for pain if needed. 

Return in 4-6 weeks with OCT OU, possible Avastin OS

## 2022-10-18 ENCOUNTER — OFFICE VISIT (OUTPATIENT)
Facility: LOCATION | Age: 82
End: 2022-10-18
Payer: MEDICARE

## 2022-10-18 DIAGNOSIS — H04.123 DRY EYE SYNDROME OF BILATERAL LACRIMAL GLANDS: ICD-10-CM

## 2022-10-18 DIAGNOSIS — H43.813 VITREOUS DEGENERATION, BILATERAL: ICD-10-CM

## 2022-10-18 DIAGNOSIS — Z96.1 PRESENCE OF INTRAOCULAR LENS: ICD-10-CM

## 2022-10-18 DIAGNOSIS — E11.3293 TYPE 2 DIAB WITH MILD NONP RTNOP WITHOUT MACULAR EDEMA, BI: ICD-10-CM

## 2022-10-18 DIAGNOSIS — H35.3231 EXUDATIVE AGE-REL MCLR DEGN, BI, WITH ACTV CHRDL NEOVAS: Primary | ICD-10-CM

## 2022-10-18 PROCEDURE — 92014 COMPRE OPH EXAM EST PT 1/>: CPT | Performed by: OPHTHALMOLOGY

## 2022-10-18 PROCEDURE — 67028 INJECTION EYE DRUG: CPT | Performed by: OPHTHALMOLOGY

## 2022-10-18 PROCEDURE — 92134 CPTRZ OPH DX IMG PST SGM RTA: CPT | Performed by: OPHTHALMOLOGY

## 2022-10-18 ASSESSMENT — INTRAOCULAR PRESSURE
OD: 13
OS: 15

## 2022-10-18 NOTE — IMPRESSION/PLAN
Impression: Macular Degeneration, dry with PED OD. Status: Symptomatic. Plan: Exam and OCT reveal a PED and atrophy OD. Exam and OCT reveal no IRF. An IVFA from 05/10/2022 demonstrated no leakage. Fundus photos from 05/10/2022 demonstrated drusen. Observe. Recommend AREANDREI and Jake.  Thanks, Marily Lane

## 2022-10-18 NOTE — IMPRESSION/PLAN
Impression: Macular Degeneration, Wet with PED OS. Status: Symptomatic.
  s/p Avastin OS, last 09/13/2022
  s/p Lucentis x 6  08/31/2021 Plan: The patient notes blurring. Exam and OCT reveal a PED OS. An IVFA from 05/10/2022 demonstrated staining. Fundus photos from 05/10/2022 demonstrated drusen. Recommend Avastin. R/B/A discussed with patient. The risks of Avastin were discussed, including off-label use and compounding pharmacy risks, and the patient elects to proceed with Avastin. After 2% Subconjunctival anesthesia & betadine prep, 1.25mg of Avastin was injected in the eye. Cold compress and Tylenol suggested for pain if needed. 

Return in 4-6 weeks with OCT OU, possible Avastin OS

## 2022-11-15 ENCOUNTER — OFFICE VISIT (OUTPATIENT)
Facility: LOCATION | Age: 82
End: 2022-11-15
Payer: MEDICARE

## 2022-11-15 DIAGNOSIS — E11.3293 TYPE 2 DIAB WITH MILD NONP RTNOP WITHOUT MACULAR EDEMA, BI: ICD-10-CM

## 2022-11-15 DIAGNOSIS — H35.3231 EXUDATIVE AGE-REL MCLR DEGN, BI, WITH ACTV CHRDL NEOVAS: Primary | ICD-10-CM

## 2022-11-15 DIAGNOSIS — Z96.1 PRESENCE OF INTRAOCULAR LENS: ICD-10-CM

## 2022-11-15 DIAGNOSIS — H43.813 VITREOUS DEGENERATION, BILATERAL: ICD-10-CM

## 2022-11-15 DIAGNOSIS — H04.123 DRY EYE SYNDROME OF BILATERAL LACRIMAL GLANDS: ICD-10-CM

## 2022-11-15 PROCEDURE — 92014 COMPRE OPH EXAM EST PT 1/>: CPT | Performed by: OPHTHALMOLOGY

## 2022-11-15 PROCEDURE — 92134 CPTRZ OPH DX IMG PST SGM RTA: CPT | Performed by: OPHTHALMOLOGY

## 2022-11-15 PROCEDURE — 67028 INJECTION EYE DRUG: CPT | Performed by: OPHTHALMOLOGY

## 2022-11-15 ASSESSMENT — INTRAOCULAR PRESSURE
OD: 15
OS: 15

## 2022-11-15 NOTE — IMPRESSION/PLAN
Impression: Macular Degeneration, Wet with PED OS. Status: Symptomatic.
  s/p Avastin OS, last 10/18/2022 
  s/p Lucentis x 6  08/31/2021 Plan: The patient notes continued blurring. Exam and OCT reveal a PED OS. An IVFA from 05/10/2022 demonstrated staining. Fundus photos from 05/10/2022 demonstrated drusen. Recommend Avastin. R/B/A discussed with patient. The risks of Avastin were discussed, including off-label use and compounding pharmacy risks, and the patient elects to proceed with Avastin. After 2% Subconjunctival anesthesia & betadine prep, 1.25mg of Avastin was injected in the eye. Cold compress and Tylenol suggested for pain if needed. 

Return in 4-6 weeks with OCT OU, possible Avastin OS

## 2022-11-15 NOTE — IMPRESSION/PLAN
Impression: Macular Degeneration, dry with PED OD. Status: Symptomatic. Plan: Exam and OCT reveal a PED and atrophy OD. Exam and OCT reveal no IRF. An IVFA from 05/10/2022 demonstrated no leakage. Fundus photos from 05/10/2022 demonstrated drusen. Observe. Recommend PATRICK and Jake.  Thanks, Prateek Jiang

## 2023-01-10 ENCOUNTER — OFFICE VISIT (OUTPATIENT)
Facility: LOCATION | Age: 83
End: 2023-01-10
Payer: MEDICARE

## 2023-01-10 DIAGNOSIS — E11.3293 TYPE 2 DIAB WITH MILD NONP RTNOP WITHOUT MACULAR EDEMA, BI: ICD-10-CM

## 2023-01-10 DIAGNOSIS — Z96.1 PRESENCE OF INTRAOCULAR LENS: ICD-10-CM

## 2023-01-10 DIAGNOSIS — H43.813 VITREOUS DEGENERATION, BILATERAL: ICD-10-CM

## 2023-01-10 DIAGNOSIS — H04.123 DRY EYE SYNDROME OF BILATERAL LACRIMAL GLANDS: ICD-10-CM

## 2023-01-10 DIAGNOSIS — H35.3231 EXUDATIVE AGE-REL MCLR DEGN, BI, WITH ACTV CHRDL NEOVAS: Primary | ICD-10-CM

## 2023-01-10 PROCEDURE — 92134 CPTRZ OPH DX IMG PST SGM RTA: CPT | Performed by: OPHTHALMOLOGY

## 2023-01-10 PROCEDURE — 67028 INJECTION EYE DRUG: CPT | Performed by: OPHTHALMOLOGY

## 2023-01-10 PROCEDURE — 92014 COMPRE OPH EXAM EST PT 1/>: CPT | Performed by: OPHTHALMOLOGY

## 2023-01-10 ASSESSMENT — INTRAOCULAR PRESSURE
OD: 16
OS: 17

## 2023-01-10 NOTE — IMPRESSION/PLAN
Impression: Macular Degeneration, Wet with PED OS. Status: Symptomatic.
  s/p Avastin OS, last 11/15/2022
  s/p Lucentis x 6  08/31/2021 Plan: Exam and OCT reveal a PED OS. An IVFA from 05/10/2022 demonstrated staining. Fundus photos from 05/10/2022 demonstrated drusen. Recommend Avastin. R/B/A discussed with patient. The risks of Avastin were discussed, including off-label use and compounding pharmacy risks, and the patient elects to proceed with Avastin. After 2% Subconjunctival anesthesia & betadine prep, 1.25mg of Avastin was injected in the eye. Cold compress and Tylenol suggested for pain if needed. 

Return in 4-6 weeks with OCT OU, possible Avastin OS, then IVFA OS 1st

## 2023-02-21 ENCOUNTER — OFFICE VISIT (OUTPATIENT)
Facility: LOCATION | Age: 83
End: 2023-02-21
Payer: MEDICARE

## 2023-02-21 DIAGNOSIS — Z96.1 PRESENCE OF INTRAOCULAR LENS: ICD-10-CM

## 2023-02-21 DIAGNOSIS — H35.3231 EXUDATIVE AGE-REL MCLR DEGN, BI, WITH ACTV CHRDL NEOVAS: Primary | ICD-10-CM

## 2023-02-21 DIAGNOSIS — H43.813 VITREOUS DEGENERATION, BILATERAL: ICD-10-CM

## 2023-02-21 DIAGNOSIS — E11.3293 TYPE 2 DIAB WITH MILD NONP RTNOP WITHOUT MACULAR EDEMA, BI: ICD-10-CM

## 2023-02-21 DIAGNOSIS — H04.123 DRY EYE SYNDROME OF BILATERAL LACRIMAL GLANDS: ICD-10-CM

## 2023-02-21 PROCEDURE — 92014 COMPRE OPH EXAM EST PT 1/>: CPT | Performed by: OPHTHALMOLOGY

## 2023-02-21 PROCEDURE — 92134 CPTRZ OPH DX IMG PST SGM RTA: CPT | Performed by: OPHTHALMOLOGY

## 2023-02-21 PROCEDURE — 67028 INJECTION EYE DRUG: CPT | Performed by: OPHTHALMOLOGY

## 2023-02-21 ASSESSMENT — INTRAOCULAR PRESSURE
OS: 10
OD: 11

## 2023-02-21 NOTE — IMPRESSION/PLAN
Impression: Macular Degeneration, dry with PED OD. Status: Symptomatic. Plan: Exam and OCT reveal a PED and atrophy OD. Exam and OCT reveal no IRF. An IVFA from 05/10/2022 demonstrated no leakage. Fundus photos from 05/10/2022 demonstrated drusen. Observe. Recommend PATRICK and Jake.  Thanks, Beata Woodward

## 2023-02-21 NOTE — IMPRESSION/PLAN
Impression: Macular Degeneration, Wet with PED OS. Status: Symptomatic.
  s/p Avastin OS, last 01/10/2023
  s/p Lucentis x 6  08/31/2021 Plan: Exam and OCT reveal a PED OS. An IVFA from 05/10/2022 demonstrated staining. Fundus photos from 05/10/2022 demonstrated drusen. Recommend Avastin. R/B/A discussed with patient. The risks of Avastin were discussed, including off-label use and compounding pharmacy risks, and the patient elects to proceed with Avastin. After 2% Subconjunctival anesthesia & betadine prep, 1.25mg of Avastin was injected in the eye. Cold compress and Tylenol suggested for pain if needed. 

Return in 4-6 weeks with OCT OU, possible Avastin OS, IVFA OS 1st

## 2023-04-11 ENCOUNTER — OFFICE VISIT (OUTPATIENT)
Facility: LOCATION | Age: 83
End: 2023-04-11
Payer: MEDICARE

## 2023-04-11 DIAGNOSIS — Z96.1 PRESENCE OF INTRAOCULAR LENS: ICD-10-CM

## 2023-04-11 DIAGNOSIS — H04.123 DRY EYE SYNDROME OF BILATERAL LACRIMAL GLANDS: ICD-10-CM

## 2023-04-11 DIAGNOSIS — E11.3293 TYPE 2 DIAB WITH MILD NONP RTNOP WITHOUT MACULAR EDEMA, BI: ICD-10-CM

## 2023-04-11 DIAGNOSIS — H43.813 VITREOUS DEGENERATION, BILATERAL: ICD-10-CM

## 2023-04-11 DIAGNOSIS — H35.3231 EXUDATIVE AGE-REL MCLR DEGN, BI, WITH ACTV CHRDL NEOVAS: Primary | ICD-10-CM

## 2023-04-11 PROCEDURE — 92134 CPTRZ OPH DX IMG PST SGM RTA: CPT | Performed by: OPHTHALMOLOGY

## 2023-04-11 PROCEDURE — 92014 COMPRE OPH EXAM EST PT 1/>: CPT | Performed by: OPHTHALMOLOGY

## 2023-04-11 PROCEDURE — 67028 INJECTION EYE DRUG: CPT | Performed by: OPHTHALMOLOGY

## 2023-04-11 ASSESSMENT — INTRAOCULAR PRESSURE
OD: 19
OS: 19

## 2023-04-11 NOTE — IMPRESSION/PLAN
Impression: Macular Degeneration, Wet with PED OS. Status: Symptomatic.
  s/p Avastin OS, last 02/21/2023
  s/p Lucentis x 6  08/31/2021 Plan: Exam and OCT reveal a PED OS. An IVFA from 05/10/2022 demonstrated staining. Fundus photos from 05/10/2022 demonstrated drusen. Recommend Avastin. R/B/A discussed with patient. The risks of Avastin were discussed, including off-label use and compounding pharmacy risks, and the patient elects to proceed with Avastin. After 2% Subconjunctival anesthesia & betadine prep, 1.25mg of Avastin was injected in the eye. Cold compress and Tylenol suggested for pain if needed. 

Return in 6 weeks with OCT OU, possible Avastin OS

## 2023-04-11 NOTE — IMPRESSION/PLAN
Impression: Macular Degeneration, dry with PED OD. Status: Symptomatic. Plan: Exam and OCT reveal a PED and atrophy OD. Exam and OCT reveal no IRF. An IVFA from 05/10/2022 demonstrated no leakage. Fundus photos from 05/10/2022 demonstrated drusen. Observe. Recommend AREDS alexandra and Jake.  Thanks, Micah Rea

## 2023-05-09 ENCOUNTER — OFFICE VISIT (OUTPATIENT)
Facility: LOCATION | Age: 83
End: 2023-05-09
Payer: MEDICARE

## 2023-05-09 DIAGNOSIS — H04.123 DRY EYE SYNDROME OF BILATERAL LACRIMAL GLANDS: ICD-10-CM

## 2023-05-09 DIAGNOSIS — Z96.1 PRESENCE OF INTRAOCULAR LENS: ICD-10-CM

## 2023-05-09 DIAGNOSIS — H43.813 VITREOUS DEGENERATION, BILATERAL: ICD-10-CM

## 2023-05-09 DIAGNOSIS — H35.3231 EXUDATIVE AGE-REL MCLR DEGN, BI, WITH ACTV CHRDL NEOVAS: Primary | ICD-10-CM

## 2023-05-09 DIAGNOSIS — E11.3293 TYPE 2 DIAB WITH MILD NONP RTNOP WITHOUT MACULAR EDEMA, BI: ICD-10-CM

## 2023-05-09 PROCEDURE — 67028 INJECTION EYE DRUG: CPT | Performed by: OPHTHALMOLOGY

## 2023-05-09 PROCEDURE — 92134 CPTRZ OPH DX IMG PST SGM RTA: CPT | Performed by: OPHTHALMOLOGY

## 2023-05-09 PROCEDURE — 92014 COMPRE OPH EXAM EST PT 1/>: CPT | Performed by: OPHTHALMOLOGY

## 2023-05-09 ASSESSMENT — INTRAOCULAR PRESSURE
OD: 18
OS: 17

## 2023-05-09 NOTE — IMPRESSION/PLAN
Impression: Macular Degeneration, dry with PED OD. Status: Symptomatic. Plan: Exam and OCT reveal a PED and atrophy OD. Exam and OCT reveal no IRF. An IVFA from 05/10/2022 demonstrated no leakage. Fundus photos from 05/10/2022 demonstrated drusen. Observe. Recommend AREDS alexandra and Jake.  Thanks, Maggie Gómez

## 2023-05-09 NOTE — IMPRESSION/PLAN
Impression: Macular Degeneration, Wet with PED OS. Status: Symptomatic.
  s/p Avastin OS, last 04/11/2023
  s/p Lucentis x 6  08/31/2021 Plan: Exam and OCT reveal a PED OS. An IVFA from 05/10/2022 demonstrated staining. Fundus photos from 05/10/2022 demonstrated drusen. Recommend Avastin. R/B/A discussed with patient. The risks of Avastin were discussed, including off-label use and compounding pharmacy risks, and the patient elects to proceed with Avastin. After 2% Subconjunctival anesthesia & betadine prep, 1.25mg of Avastin was injected in the eye. Cold compress and Tylenol suggested for pain if needed. 

Return in 6 weeks with OCT OU, possible Avastin OS

## 2023-06-20 ENCOUNTER — OFFICE VISIT (OUTPATIENT)
Facility: LOCATION | Age: 83
End: 2023-06-20
Payer: MEDICARE

## 2023-06-20 DIAGNOSIS — H43.813 VITREOUS DEGENERATION, BILATERAL: ICD-10-CM

## 2023-06-20 DIAGNOSIS — Z96.1 PRESENCE OF INTRAOCULAR LENS: ICD-10-CM

## 2023-06-20 DIAGNOSIS — H35.3231 EXUDATIVE AGE-REL MCLR DEGN, BI, WITH ACTV CHRDL NEOVAS: Primary | ICD-10-CM

## 2023-06-20 DIAGNOSIS — E11.3293 TYPE 2 DIAB WITH MILD NONP RTNOP WITHOUT MACULAR EDEMA, BI: ICD-10-CM

## 2023-06-20 PROCEDURE — 99214 OFFICE O/P EST MOD 30 MIN: CPT | Performed by: STUDENT IN AN ORGANIZED HEALTH CARE EDUCATION/TRAINING PROGRAM

## 2023-06-20 PROCEDURE — 92134 CPTRZ OPH DX IMG PST SGM RTA: CPT | Performed by: STUDENT IN AN ORGANIZED HEALTH CARE EDUCATION/TRAINING PROGRAM

## 2023-06-20 ASSESSMENT — INTRAOCULAR PRESSURE
OD: 20
OS: 14

## 2023-06-20 NOTE — IMPRESSION/PLAN
Impression: Macular Degeneration, Wet with PED OS. Status: Symptomatic.
  s/p Avastin OS, last 04/11/2023
  s/p Lucentis x 6  08/31/2021 OCT: helenan no fluid OD; PED no fluid OS Plan: -CNVM OS s/p injections OS -- currently fluid resolved
-discussed options including anti-VEGF vs observation, and elect to observe today
-return precautions discussed
-[place] in [place] -- will have records sent at that time RTC: 6-8 weeks OCT OU / re-eval Avastin OU

## 2023-08-15 ENCOUNTER — OFFICE VISIT (OUTPATIENT)
Facility: LOCATION | Age: 83
End: 2023-08-15
Payer: MEDICARE

## 2023-08-15 DIAGNOSIS — Z96.1 PRESENCE OF INTRAOCULAR LENS: ICD-10-CM

## 2023-08-15 DIAGNOSIS — H35.3221 EXUDATIVE MACULAR DEGENERATION, WITH ACTIVE CHOROIDAL NEOVASCULARIZATION, LEFT EYE: Primary | ICD-10-CM

## 2023-08-15 DIAGNOSIS — E11.3293 TYPE 2 DIAB WITH MILD NONP RTNOP WITHOUT MACULAR EDEMA, BI: ICD-10-CM

## 2023-08-15 DIAGNOSIS — H35.3231 EXUDATIVE AGE-RELATED MACULAR DEGENERATION, BILATERAL, WITH ACTIVE CHOROIDAL NEOVASCULARIZATION: ICD-10-CM

## 2023-08-15 DIAGNOSIS — H43.813 VITREOUS DEGENERATION, BILATERAL: ICD-10-CM

## 2023-08-15 DIAGNOSIS — H35.3114 NONEXUDATIVE MACULAR DEGENERATION, ADVANCED ATROPHIC WITH SUBFOVEAL INVOLVEMENT, RIGHT EYE: ICD-10-CM

## 2023-08-15 PROCEDURE — 67028 INJECTION EYE DRUG: CPT | Performed by: STUDENT IN AN ORGANIZED HEALTH CARE EDUCATION/TRAINING PROGRAM

## 2023-08-15 PROCEDURE — 99213 OFFICE O/P EST LOW 20 MIN: CPT | Performed by: STUDENT IN AN ORGANIZED HEALTH CARE EDUCATION/TRAINING PROGRAM

## 2023-08-15 PROCEDURE — 92134 CPTRZ OPH DX IMG PST SGM RTA: CPT | Performed by: STUDENT IN AN ORGANIZED HEALTH CARE EDUCATION/TRAINING PROGRAM

## 2023-08-15 ASSESSMENT — INTRAOCULAR PRESSURE
OS: 16
OD: 16

## 2023-12-06 NOTE — IMPRESSION/PLAN
Impression: COLBY MUNOZ. Status: Symptomatic.  Plan: AFT's
Impression: Macular Degeneration, Wet with PED OS. Status: Symptomatic.
  s/p Avastin OS, last 02/02/2021
  s/p Lucentis x 2  03/30/2021 Plan: Exam and OCT reveal a PED OS. An IVFA from 02/02/2021 demonstrated staining. Recommend Lucentis. R/B/A discussed with patient, and the patient elects to proceed with Lucentis. Return in 4-6 weeks with OCT OU, possible Lucentis OS.
Impression: NPDR, OU. Symptomatic. Plan: DM since 2015. No DME. BS control.
Impression: PC IOL, OU. Status: Stable. Plan: Stable.
Impression: PVD, OU. Status: Chronic. Plan: RDW.
no